# Patient Record
Sex: FEMALE | Race: WHITE | NOT HISPANIC OR LATINO | URBAN - METROPOLITAN AREA
[De-identification: names, ages, dates, MRNs, and addresses within clinical notes are randomized per-mention and may not be internally consistent; named-entity substitution may affect disease eponyms.]

---

## 2023-06-19 ENCOUNTER — CLAIMS CREATED FROM THE CLAIM WINDOW (OUTPATIENT)
Dept: URBAN - METROPOLITAN AREA MEDICAL CENTER 18 | Facility: MEDICAL CENTER | Age: 73
End: 2023-06-19
Payer: COMMERCIAL

## 2023-06-19 ENCOUNTER — CLAIMS CREATED FROM THE CLAIM WINDOW (OUTPATIENT)
Dept: URBAN - METROPOLITAN AREA MEDICAL CENTER 18 | Facility: MEDICAL CENTER | Age: 73
End: 2023-06-19

## 2023-06-19 DIAGNOSIS — K91.89 OTHER POSTPROCEDURAL COMPLICATIONS AND DISORDERS OF DIGESTIVE SYSTEM: ICD-10-CM

## 2023-06-19 DIAGNOSIS — Z90.49 ABSENCE OF GALLBLADDER: ICD-10-CM

## 2023-06-19 DIAGNOSIS — K83.8 ACQUIRED DILATION OF BILE DUCT: ICD-10-CM

## 2023-06-19 DIAGNOSIS — K91.89 AFFERENT LOOP SYNDROME: ICD-10-CM

## 2023-06-19 PROCEDURE — 99222 1ST HOSP IP/OBS MODERATE 55: CPT | Performed by: PHYSICIAN ASSISTANT

## 2023-06-19 PROCEDURE — 99223 1ST HOSP IP/OBS HIGH 75: CPT | Performed by: INTERNAL MEDICINE

## 2023-06-19 PROCEDURE — G8427 DOCREV CUR MEDS BY ELIG CLIN: HCPCS | Performed by: PHYSICIAN ASSISTANT

## 2023-06-19 PROCEDURE — 43260 ERCP W/SPECIMEN COLLECTION: CPT | Performed by: INTERNAL MEDICINE

## 2023-11-03 ENCOUNTER — OFFICE VISIT (OUTPATIENT)
Dept: URBAN - METROPOLITAN AREA CLINIC 128 | Facility: CLINIC | Age: 73
End: 2023-11-03
Payer: COMMERCIAL

## 2023-11-03 VITALS
BODY MASS INDEX: 19.79 KG/M2 | SYSTOLIC BLOOD PRESSURE: 118 MMHG | DIASTOLIC BLOOD PRESSURE: 64 MMHG | HEART RATE: 74 BPM | WEIGHT: 100.8 LBS | TEMPERATURE: 98.1 F | HEIGHT: 60 IN

## 2023-11-03 DIAGNOSIS — K59.01 CONSTIPATION BY DELAYED COLONIC TRANSIT: ICD-10-CM

## 2023-11-03 DIAGNOSIS — J90 PLEURAL EFFUSION: ICD-10-CM

## 2023-11-03 DIAGNOSIS — Z86.010 PERSONAL HISTORY OF COLONIC POLYPS: ICD-10-CM

## 2023-11-03 PROBLEM — 428283002: Status: ACTIVE | Noted: 2023-11-03

## 2023-11-03 PROCEDURE — 99204 OFFICE O/P NEW MOD 45 MIN: CPT | Performed by: PHYSICIAN ASSISTANT

## 2023-11-03 RX ORDER — AMLODIPINE BESYLATE 2.5 MG/1
1 TABLET TABLET ORAL ONCE A DAY
Status: ACTIVE | COMMUNITY

## 2023-11-03 RX ORDER — LINACLOTIDE 145 UG/1
1 CAPSULE AT LEAST 30 MINUTES BEFORE THE FIRST MEAL OF THE DAY ON AN EMPTY STOMACH CAPSULE, GELATIN COATED ORAL ONCE A DAY
Qty: 30 | OUTPATIENT
Start: 2023-11-03 | End: 2023-12-02

## 2023-11-03 RX ORDER — LINACLOTIDE 72 UG/1
1 CAPSULE AT LEAST 30 MINUTES BEFORE THE FIRST MEAL OF THE DAY ON AN EMPTY STOMACH CAPSULE, GELATIN COATED ORAL ONCE A DAY
Status: ACTIVE | COMMUNITY

## 2023-11-03 NOTE — PHYSICAL EXAM HENT:
Head,  normocephalic,  atraumatic,  Face,  Face within normal limits,  Ears,  External ears within normal limits,  Nose/Nasopharynx,  External nose  normal appearance, no nasal discharge,  Mouth and Throat,  Oral cavity appearance normal Lips,  Appearance normal Home with home care

## 2023-11-03 NOTE — PHYSICAL EXAM GASTROINTESTINAL
Abdomen , soft, tenderness to palpation in the RUQ, a healing 12.5cm incision was noted in the midline of the abdomen, nondistended , no guarding or rigidity , no masses palpable , normal bowel sounds, negative psoas and obturators signs, negative CVA tenderness bilaterally Liver and Spleen , no hepatosplenomegaly Rectal deferred

## 2023-11-03 NOTE — HPI-TODAY'S VISIT:
The patient elicits having constipation. She states it has been lifelong but has worsened after her cholecystectomy 4 months ago. Initially Dr. Childers performed the laparascopic cholecystectomy and the bile duct was knicked so Dr. Cardoza had to perform a Tasia-en-Y hepaticjejunostomy with evacuation of bilous ascites. Her 06/2023 abdominal and pelvic CT scan revealed no acute abnormalities, postpsurgical chnages if hepaticojejuostomy with stent in place, diverticulosis, distended urinary bladder, uterine fibroids. Despite taking linzess and dulcolax she is having 1 BM every 2 days. It is aggravated by:eating foods Associated symptoms: none Current stress: yes Recent medication changes: linzess 72mcg qd Recent dietary changes: none Last colonoscopy date: 2019, no specimens were collected, to be repeated in 5 years due to personal history of colon polyps Prior to surgery she had an MRI/MRCP which revealed the biliary leak wth extraluminal contrast present within thegallbladder fossa on the hepatobiliary phase, The proximal Extra hepatic bile duct is obscured by susceptibility artifact from the cholecystectomy. Contrast is seen within the central intrahepatic biledups and not within the common biledups, findings raised the possibility of bile duct disruption in the region of the proximal extrohepatic bowel duct.  follow up ct scan revealed moderate free fluid In the abdomen extending into the pelvis. Volume of the fluid is somewhat atypical for recent cholecystectomy. Correlation for any possibility of bile leak is suggested, moderate right plural effusion. She had an ERCP done in 06/2023 Patient denies any family history of colon polyps or colon cancer Patient denies heart, lung or kdiney problems. Patient denies blood thinner or O2 use.

## 2023-11-14 LAB
A/G RATIO: 1.7
ABSOLUTE BASOPHILS: 59
ABSOLUTE EOSINOPHILS: 29
ABSOLUTE LYMPHOCYTES: 1367
ABSOLUTE MONOCYTES: 431
ABSOLUTE NEUTROPHILS: 3014
ALBUMIN: 4.4
ALKALINE PHOSPHATASE: 103
ALT (SGPT): 36
AST (SGOT): 28
BASOPHILS: 1.2
BILIRUBIN, TOTAL: 0.4
BUN/CREATININE RATIO: (no result)
BUN: 10
CALCIUM: 9.7
CARBON DIOXIDE, TOTAL: 24
CHLORIDE: 105
CREATININE: 0.62
EGFR: 95
EOSINOPHILS: 0.6
GLOBULIN, TOTAL: 2.6
GLUCOSE: 86
HEMATOCRIT: 38
HEMOGLOBIN: 12.4
LYMPHOCYTES: 27.9
MCH: 27.6
MCHC: 32.6
MCV: 84.6
MONOCYTES: 8.8
MPV: 9.6
NEUTROPHILS: 61.5
PLATELET COUNT: 298
POTASSIUM: 4.5
PROTEIN, TOTAL: 7
RDW: 13.1
RED BLOOD CELL COUNT: 4.49
SODIUM: 142
TSH W/REFLEX TO FT4: 1.96
WHITE BLOOD CELL COUNT: 4.9

## 2023-12-08 ENCOUNTER — ERX REFILL RESPONSE (OUTPATIENT)
Dept: URBAN - METROPOLITAN AREA CLINIC 128 | Facility: CLINIC | Age: 73
End: 2023-12-08

## 2023-12-08 RX ORDER — LINACLOTIDE 72 UG/1
1 CAPSULE AT LEAST 30 MINUTES BEFORE A MEAL CAPSULE, GELATIN COATED ORAL ONCE A DAY
Qty: 30 | Refills: 5 | OUTPATIENT

## 2023-12-08 RX ORDER — LINACLOTIDE 72 UG/1
1 CAPSULE AT LEAST 30 MINUTES BEFORE THE FIRST MEAL OF THE DAY ON AN EMPTY STOMACH CAPSULE, GELATIN COATED ORAL ONCE A DAY
OUTPATIENT

## 2024-01-03 ENCOUNTER — OFFICE VISIT (OUTPATIENT)
Dept: URBAN - METROPOLITAN AREA CLINIC 128 | Facility: CLINIC | Age: 74
End: 2024-01-03
Payer: COMMERCIAL

## 2024-01-03 ENCOUNTER — LAB OUTSIDE AN ENCOUNTER (OUTPATIENT)
Dept: URBAN - METROPOLITAN AREA CLINIC 128 | Facility: CLINIC | Age: 74
End: 2024-01-03

## 2024-01-03 VITALS
HEIGHT: 60 IN | BODY MASS INDEX: 20.03 KG/M2 | DIASTOLIC BLOOD PRESSURE: 75 MMHG | HEART RATE: 82 BPM | TEMPERATURE: 97.3 F | WEIGHT: 102 LBS | SYSTOLIC BLOOD PRESSURE: 131 MMHG

## 2024-01-03 DIAGNOSIS — Z86.010 PERSONAL HISTORY OF COLONIC POLYPS: ICD-10-CM

## 2024-01-03 DIAGNOSIS — J90 PLEURAL EFFUSION: ICD-10-CM

## 2024-01-03 DIAGNOSIS — K59.01 CONSTIPATION BY DELAYED COLONIC TRANSIT: ICD-10-CM

## 2024-01-03 DIAGNOSIS — R79.89 ELEVATED LIVER FUNCTION TESTS: ICD-10-CM

## 2024-01-03 DIAGNOSIS — R10.13 EPIGASTRIC PAIN: ICD-10-CM

## 2024-01-03 PROCEDURE — 99214 OFFICE O/P EST MOD 30 MIN: CPT | Performed by: PHYSICIAN ASSISTANT

## 2024-01-03 RX ORDER — LINACLOTIDE 72 UG/1
1 CAPSULE AT LEAST 30 MINUTES BEFORE A MEAL CAPSULE, GELATIN COATED ORAL ONCE A DAY
Qty: 30 | Refills: 5 | Status: DISCONTINUED | COMMUNITY

## 2024-01-03 RX ORDER — HYOSCYAMINE SULFATE 0.12 MG/1
1 TABLET UNDER THE TONGUE AND ALLOW TO DISSOLVE  AS NEEDED TABLET, ORALLY DISINTEGRATING ORAL THREE TIMES A DAY
Qty: 30 | Refills: 0 | OUTPATIENT
Start: 2024-01-03 | End: 2024-02-02

## 2024-01-03 RX ORDER — AMLODIPINE BESYLATE 2.5 MG/1
1 TABLET TABLET ORAL ONCE A DAY
COMMUNITY

## 2024-01-03 NOTE — HPI-TODAY'S VISIT:
The patient elicits having constipation which is well controlled since linzess 145mcg qd was started. She states it has been lifelong but has worsened after her cholecystectomy 4 months ago. Initially Dr. Childers performed the laparascopic cholecystectomy and the bile duct was knicked so Dr. Cardoza had to perform a Tasia-en-Y hepaticjejunostomy with evacuation of bilous ascites. Her 06/2023 abdominal and pelvic CT scan revealed no acute abnormalities, postpsurgical chnages if hepaticojejuostomy with stent in place, diverticulosis, distended urinary bladder, uterine fibroids. Despite taking linzess and dulcolax she is having 1 BM every 2 days. It is aggravated by:eating foods Associated symptoms: none Current stress: yes Recent medication changes: linzess 72mcg qd Recent dietary changes: none Last colonoscopy date: 2019, no specimens were collected, to be repeated in 5 years due to personal history of colon polyps Prior to surgery she had an MRI/MRCP which revealed the biliary leak wth extraluminal contrast present within thegallbladder fossa on the hepatobiliary phase, The proximal Extra hepatic bile duct is obscured by susceptibility artifact from the cholecystectomy. Contrast is seen within the central intrahepatic biledups and not within the common biledups, findings raised the possibility of bile duct disruption in the region of the proximal extrohepatic bowel duct.  follow up ct scan revealed moderate free fluid In the abdomen extending into the pelvis. Volume of the fluid is somewhat atypical for recent cholecystectomy. Correlation for any possibility of bile leak is suggested, moderate right plueral effusion. She had an ERCP done in 06/2023 Patient denies any family history of colon polyps or colon cancer Patient denies heart, lung or kdiney problems. Patient denies blood thinner or O2 use. Her ALT was 36, rest of labs were normal.

## 2024-01-03 NOTE — PHYSICAL EXAM GASTROINTESTINAL
Abdomen , soft, tenderness to palpation in the epigastric region, a healing 12.5cm incision was noted in the midline of the abdomen, nondistended , no guarding or rigidity , no masses palpable , normal bowel sounds, negative psoas and obturators signs, negative CVA tenderness bilaterally Liver and Spleen , no hepatosplenomegaly Rectal deferred

## 2024-01-06 LAB
ALBUMIN/GLOBULIN RATIO: 1.8
ALBUMIN: 4.8
ALKALINE PHOSPHATASE: 130
ALT (SGPT): 33
AST (SGOT): 25
BILIRUBIN, DIRECT: 0.1
BILIRUBIN, INDIRECT: 0.5
BILIRUBIN, TOTAL: 0.6
GLOBULIN: 2.6
PROTEIN, TOTAL: 7.4

## 2024-01-11 ENCOUNTER — LAB OUTSIDE AN ENCOUNTER (OUTPATIENT)
Dept: URBAN - METROPOLITAN AREA CLINIC 128 | Facility: CLINIC | Age: 74
End: 2024-01-11

## 2024-01-11 LAB
CREATININE POC: 0.5
PERFORMING LAB: (no result)

## 2024-02-16 ENCOUNTER — OV CON (OUTPATIENT)
Dept: URBAN - METROPOLITAN AREA CLINIC 80 | Facility: CLINIC | Age: 74
End: 2024-02-16
Payer: COMMERCIAL

## 2024-02-16 VITALS
HEART RATE: 88 BPM | DIASTOLIC BLOOD PRESSURE: 82 MMHG | TEMPERATURE: 97 F | BODY MASS INDEX: 19.63 KG/M2 | WEIGHT: 100 LBS | HEIGHT: 60 IN | SYSTOLIC BLOOD PRESSURE: 141 MMHG

## 2024-02-16 DIAGNOSIS — K59.01 CONSTIPATION: ICD-10-CM

## 2024-02-16 DIAGNOSIS — R10.13 EPIGASTRIC PAIN: ICD-10-CM

## 2024-02-16 PROBLEM — 14760008: Status: ACTIVE | Noted: 2024-02-16

## 2024-02-16 PROCEDURE — 99214 OFFICE O/P EST MOD 30 MIN: CPT | Performed by: INTERNAL MEDICINE

## 2024-02-16 RX ORDER — AMLODIPINE BESYLATE 2.5 MG/1
1 TABLET TABLET ORAL ONCE A DAY
Status: ACTIVE | COMMUNITY

## 2024-02-16 NOTE — HPI-TODAY'S VISIT:
She comes in today to discuss ongoing GI symptoms. She has a very complicated GI history following a cholecystectomy complicated by clipping of the common bile duct with resulting burak-en-Y with hepaticojejunostomy.  She will have intermittent points of extreme epigastric pain which seem to occur without any clear trigger. She does have issues with chronic constipation and she has not responded well to varying dose of linzess.

## 2024-02-19 ENCOUNTER — COLON (OUTPATIENT)
Dept: URBAN - METROPOLITAN AREA SURGERY CENTER 31 | Facility: SURGERY CENTER | Age: 74
End: 2024-02-19
Payer: COMMERCIAL

## 2024-02-19 DIAGNOSIS — K59.09 CHANGE IN BOWEL MOVEMENTS INTERMITTENT CONSTIPATION. URGENCY IN THE MORNING.: ICD-10-CM

## 2024-02-19 DIAGNOSIS — Z86.010 ADENOMAS PERSONAL HISTORY OF COLONIC POLYPS: ICD-10-CM

## 2024-02-19 PROCEDURE — G8907 PT DOC NO EVENTS ON DISCHARG: HCPCS | Performed by: INTERNAL MEDICINE

## 2024-02-19 PROCEDURE — 45378 DIAGNOSTIC COLONOSCOPY: CPT | Performed by: INTERNAL MEDICINE

## 2024-03-18 ENCOUNTER — OV EP (OUTPATIENT)
Dept: URBAN - METROPOLITAN AREA CLINIC 128 | Facility: CLINIC | Age: 74
End: 2024-03-18
Payer: COMMERCIAL

## 2024-03-18 VITALS
SYSTOLIC BLOOD PRESSURE: 106 MMHG | TEMPERATURE: 97.5 F | DIASTOLIC BLOOD PRESSURE: 68 MMHG | BODY MASS INDEX: 19.44 KG/M2 | WEIGHT: 99 LBS | HEIGHT: 60 IN

## 2024-03-18 DIAGNOSIS — R10.13 EPIGASTRIC PAIN: ICD-10-CM

## 2024-03-18 DIAGNOSIS — K59.09 CHANGE IN BOWEL MOVEMENTS INTERMITTENT CONSTIPATION. URGENCY IN THE MORNING.: ICD-10-CM

## 2024-03-18 DIAGNOSIS — Z86.010 PERSONAL HISTORY OF COLONIC POLYPS: ICD-10-CM

## 2024-03-18 PROCEDURE — 99213 OFFICE O/P EST LOW 20 MIN: CPT | Performed by: PHYSICIAN ASSISTANT

## 2024-03-18 RX ORDER — AMLODIPINE BESYLATE 2.5 MG/1
1 TABLET TABLET ORAL ONCE A DAY
Status: ACTIVE | COMMUNITY

## 2024-03-18 NOTE — PHYSICAL EXAM GASTROINTESTINAL
Abdomen , soft, nontender, nondistended , no guarding or rigidity , no masses palpable , normal bowel sounds, old surgical scars, negative psoas and obturators signs, negative CVA tenderness bilaterally Liver and Spleen , no hepatosplenomegaly Rectal deferred

## 2024-03-18 NOTE — HPI-TODAY'S VISIT:
She comes in today for a follow up. She has a very complicated GI history following a cholecystectomy complicated by clipping of the common bile duct with resulting burak-en-Y with hepaticojejunostomy.  She will have intermittent points of extreme epigastric pain which seem to occur without any clear trigger. She does have issues with chronic constipation and she was on trulance but it did not help her so she will go back on the linzess. 02/2024 abdominal and pelvic CT scan was normal. 2024 colonoscopy was normal, no specimens were collected, to be done in 2 years in 2026 due to inadequate prep.

## 2024-06-21 ENCOUNTER — OFFICE VISIT (OUTPATIENT)
Dept: URBAN - METROPOLITAN AREA CLINIC 80 | Facility: CLINIC | Age: 74
End: 2024-06-21